# Patient Record
Sex: FEMALE | Race: WHITE
[De-identification: names, ages, dates, MRNs, and addresses within clinical notes are randomized per-mention and may not be internally consistent; named-entity substitution may affect disease eponyms.]

---

## 2022-07-13 ENCOUNTER — HOSPITAL ENCOUNTER (OUTPATIENT)
Dept: HOSPITAL 33 - ED | Age: 82
Setting detail: OBSERVATION
LOS: 1 days | Discharge: HOME | End: 2022-07-14
Attending: FAMILY MEDICINE | Admitting: FAMILY MEDICINE
Payer: MEDICARE

## 2022-07-13 DIAGNOSIS — Z20.828: ICD-10-CM

## 2022-07-13 DIAGNOSIS — R00.1: ICD-10-CM

## 2022-07-13 DIAGNOSIS — R42: ICD-10-CM

## 2022-07-13 DIAGNOSIS — R77.8: Primary | ICD-10-CM

## 2022-07-13 DIAGNOSIS — E78.00: ICD-10-CM

## 2022-07-13 DIAGNOSIS — Z79.899: ICD-10-CM

## 2022-07-13 DIAGNOSIS — I10: ICD-10-CM

## 2022-07-13 LAB
ALBUMIN SERPL-MCNC: 3.4 G/DL (ref 3.5–5)
ALP SERPL-CCNC: 92 U/L (ref 38–126)
ALT SERPL-CCNC: 9 U/L (ref 0–35)
ANION GAP SERPL CALC-SCNC: 13.6 MEQ/L (ref 5–15)
AST SERPL QL: 23 U/L (ref 14–36)
BASOPHILS # BLD AUTO: 0.06 X10^3/UL (ref 0–0.4)
BILIRUB BLD-MCNC: 0.3 MG/DL (ref 0.2–1.3)
BUN SERPL-MCNC: 19 MG/DL (ref 7–17)
CALCIUM SPEC-MCNC: 9.8 MG/DL (ref 8.4–10.2)
CHLORIDE SERPL-SCNC: 102 MMOL/L (ref 98–107)
CO2 SERPL-SCNC: 27 MMOL/L (ref 22–30)
CREAT SERPL-MCNC: 0.94 MG/DL (ref 0.52–1.04)
EOSINOPHIL # BLD AUTO: 0.09 X10^3/UL (ref 0–0.5)
FLUAV AG NPH QL IA: NEGATIVE
FLUBV AG NPH QL IA: NEGATIVE
GFR SERPLBLD BASED ON 1.73 SQ M-ARVRAT: > 60 ML/MIN
GLUCOSE SERPL-MCNC: 110 MG/DL (ref 74–106)
GLUCOSE UR-MCNC: NEGATIVE MG/DL
HCT VFR BLD AUTO: 38.8 % (ref 35–47)
HGB BLD-MCNC: 12.2 G/DL (ref 12–16)
LYMPHOCYTES # SPEC AUTO: 1.54 X10^3/UL (ref 1–4.6)
MAGNESIUM SERPL-MCNC: 2 MG/DL (ref 1.6–2.3)
MCH RBC QN AUTO: 27.6 PG (ref 26–32)
MCHC RBC AUTO-ENTMCNC: 31.4 G/DL (ref 32–36)
MONOCYTES # BLD AUTO: 0.73 X10^3/UL (ref 0–1.3)
PLATELET # BLD AUTO: 299 X10^3/UL (ref 150–450)
POTASSIUM SERPLBLD-SCNC: 4 MMOL/L (ref 3.5–5.1)
PROT SERPL-MCNC: 6.5 G/DL (ref 6.3–8.2)
PROT UR STRIP-MCNC: NEGATIVE MG/DL
RBC # BLD AUTO: 4.42 X10^6/UL (ref 4.1–5.4)
RBC # UR AUTO: NEGATIVE ERY/UL (ref 0–5)
RBC #/AREA URNS HPF: (no result) /HPF (ref 0–2)
RSV AG SPEC QL IA: NEGATIVE
SARS-COV-2 AG RESP QL IA.RAPID: NEGATIVE
SODIUM SERPL-SCNC: 139 MMOL/L (ref 137–145)
UA DIPSTICK PNL UR: (no result)
URINE CULTURED INDICATED?: NO
WBC # BLD AUTO: 7.2 X10^3/UL (ref 4–10.5)
WBC #/AREA URNS HPF: (no result) /HPF (ref 0–5)

## 2022-07-13 PROCEDURE — 0241U: CPT

## 2022-07-13 PROCEDURE — 36415 COLL VENOUS BLD VENIPUNCTURE: CPT

## 2022-07-13 PROCEDURE — 93005 ELECTROCARDIOGRAM TRACING: CPT

## 2022-07-13 PROCEDURE — 83735 ASSAY OF MAGNESIUM: CPT

## 2022-07-13 PROCEDURE — 93041 RHYTHM ECG TRACING: CPT

## 2022-07-13 PROCEDURE — 84484 ASSAY OF TROPONIN QUANT: CPT

## 2022-07-13 PROCEDURE — 36000 PLACE NEEDLE IN VEIN: CPT

## 2022-07-13 PROCEDURE — 80053 COMPREHEN METABOLIC PANEL: CPT

## 2022-07-13 PROCEDURE — 70450 CT HEAD/BRAIN W/O DYE: CPT

## 2022-07-13 PROCEDURE — 85025 COMPLETE CBC W/AUTO DIFF WBC: CPT

## 2022-07-13 PROCEDURE — 93268 ECG RECORD/REVIEW: CPT

## 2022-07-13 PROCEDURE — G0378 HOSPITAL OBSERVATION PER HR: HCPCS

## 2022-07-13 PROCEDURE — 81015 MICROSCOPIC EXAM OF URINE: CPT

## 2022-07-13 PROCEDURE — 99285 EMERGENCY DEPT VISIT HI MDM: CPT

## 2022-07-13 NOTE — ERPHSYRPT
- History of Present Illness


Time Seen by Provider: 07/13/22 09:58


Source: patient


Exam Limitations: no limitations


Patient Subjective Stated Complaint: Dizziness


Triage Nursing Assessment: Patient brought down to ED from cardiac rehab. 

Patient A+O X 3. Patient's skin pink ,warm and dry. Patient states she was doing

cardiac rehab when she started feeling dizzy. Patient states she feels better 

now and denies dizziness. Cardiac Rehab RN states patient's blood pressure 

usually runs high but today it was been running low. Patient denies pain or d

iscomfort.


Physician History: 





This is an 82-year-old white female who has a history of hypertension and was in

cardiac rehab when she was experiencing dizziness.  Patient was found to have 

low blood pressure and a low heart rate.  Patient denies chest pain at this time

and also denies dizziness upon entrance into the emergency department.  Her 

dizziness has resolved.  Patient does have a cardiologist, Dr. Bolanos.  Patient

did take a pain medicine this morning per her report.  She wonders if this might

have contributed to her symptoms.  Her heart rate upon arrival to the emergency 

department is 47 bpm.  Her blood pressure upon arrival to the emergency 

department was 125/62.  There are no new medications.  Patient denies shortness 

of breath.  She denies abdominal pain.  She has no nausea vomiting or diarrhea.


Timing/Duration: today


Severity: mild (And now resolved)


Character of Deficits: none


Deficits: no difficulties


Baseline/Normal Cognition: alert oriented x 3


Current Cognition: alert oriented x 3


Baseline Gait: walks w/o assistance


Associated Symptoms: denies symptoms


Allergies/Adverse Reactions: 








No Known Drug Allergies Allergy (Verified 07/13/22 09:48)


   





Home Medications: 








Amlodipine Besylate 10 mg [Norvasc 10 MG] 10 mg PO QAM 01/21/14 [History]


Lisinopril/Hydrochlorothiazide [Lisinopril-Hctz 20-25 mg Tab] 1 each PO QAM 

01/21/14 [History]





Hx Tetanus, Diphtheria Vaccination/Date Given: No


Hx Influenza Vaccination/Date Given: No


Hx Pneumococcal Vaccination/Date Given: No


Immunizations Up to Date: Yes





Travel Risk





- International Travel


Have you traveled outside of the country in past 3 weeks: No





- Coronavirus Screening


Are you exhibiting any of the following symptoms?: No


Close contact with a COVID-19 positive Pt in past 14-21 Days: No





- Vaccine Status


Have you recieved a Covid-19 vaccination: No





- Review of Systems


Constitutional: No Symptoms


Eyes: No Symptoms


Ears, Nose, & Throat: No Symptoms


Respiratory: No Symptoms


Cardiac: No Symptoms


Abdominal/Gastrointestinal: No Symptoms


Genitourinary Symptoms: No Symptoms


Musculoskeletal: No Symptoms


Skin: No Symptoms


Neurological: Dizziness


Psychological: No Symptoms


Endocrine: No Symptoms


Hematologic/Lymphatic: No Symptoms


Immunological/Allergic: No Symptoms


All Other Systems: Reviewed and Negative





- Past Medical History


Pertinent Past Medical History: Yes


Neurological History: No Pertinent History


ENT History: No Pertinent History


Cardiac History: Hypertension


Respiratory History: No Pertinent History


Endocrine Medical History: No Pertinent History


Musculoskeletal History: No Pertinent History


GI Medical History: No Pertinent History


 History: No Pertinent History


Psycho-Social History: No Pertinent History


Female Reproductive Disorders: No Pertinent History





- Past Surgical History


Past Surgical History: Yes


Neuro Surgical History: No Pertinent History


Cardiac: No Pertinent History


Respiratory: No Pertinent History


Gastrointestinal: No Pertinent History


Genitourinary: No Pertinent History


Musculoskeletal: Joint Replacement


Female Surgical History: No Pertinent History


Other Surgical History: RIGHT HIP REPLACEMENT,.  tonsillectomy





- Social History


Smoking Status: Never smoker


Exposure to second hand smoke: Yes (occasional)


Drug Use: none


Patient Lives Alone: Yes





- Nursing Vital Signs


Nursing Vital Signs: 


                               Initial Vital Signs











Temperature  96.0 F   07/13/22 09:49


 


Pulse Rate  47 L  07/13/22 09:49


 


Respiratory Rate  19   07/13/22 09:49


 


Blood Pressure  125/62   07/13/22 09:49


 


O2 Sat by Pulse Oximetry  97   07/13/22 09:49








                                   Pain Scale











Pain Intensity                 0

















- [place] Coma Scale


Best Eye Response (Holly): (4) open spontaneously


Best Verbal Response (Holly): (5) oriented


Best Motor Response ([place]): (6) obeys commands


Holly Total: 15





- Physical Exam


General Appearance: no apparent distress, alert, anxiety


Eye Exam: bilateral eye: normal inspection, PERRL, EOMI


Ears, Nose, Throat Exam: normal ENT inspection, moist mucous membranes


Neck Exam: normal inspection, non-tender, supple, full range of motion


Respiratory: normal breath sounds, lungs clear, airway intact, No chest 

tenderness, No respiratory distress


Cardiovascular: normal heart sounds, normal peripheral pulses, bradycardia


Gastrointestinal: soft, normal bowel sounds, No tenderness


Pelvic Exam: not done


Rectal Exam: not done


Back Exam: normal inspection, normal range of motion, No CVA tenderness, No 

vertebral tenderness


Extremity Exam: normal inspection, normal range of motion, pelvis stable


Mental Status: alert, oriented x 3, cooperative


CNs Exam: normal hearing, normal speech, PERRL, tongue midline


Motor/Sensory: no motor deficit, no sensory deficit, no pronator drift


Skin Exam: normal color, warm, dry


**SpO2 Interpretation**: normal


SpO2: 97


O2 Delivery: Room Air





- Course


Nursing assessment & vital signs reviewed: Yes


EKG Interpreted by Me: RATE ([id number]), Sinus Dante, Non-specific ST Changes, Other 

(No obvious acute ischemic changes on today's EKG.  There is not a comparison 

EKG available.)


Ordered Tests: 


                               Active Orders 24 hr











 Category Date Time Status


 


 Cardiac Monitor STAT Care  07/13/22 10:04 Active


 


 EKG-ER Only STAT Care  07/13/22 10:04 Active


 


 EKG-ER Only STAT Care  07/13/22 15:02 Active


 


 IV Insertion STAT Care  07/13/22 10:04 Active


 


 HEAD WITHOUT CONTRAST [CT] Stat Exams  07/13/22 10:04 Completed


 


 CBC W DIFF Stat Lab  07/13/22 10:15 Completed


 


 CMP Stat Lab  07/13/22 10:15 Completed


 


 MAGNESIUM Stat Lab  07/13/22 10:15 Completed


 


 TROPONIN Q3H Lab  07/13/22 10:15 Completed


 


 TROPONIN Q3H Lab  07/13/22 13:28 Completed


 


 TROPONIN Q3H Lab  07/13/22 15:55 Received


 


 TROPONIN Q3H Lab  07/13/22 19:15 Ordered


 


 TROPONIN Q3H Lab  07/13/22 22:15 Ordered


 


 UA W/RFX CULTURE Stat Lab  07/13/22 11:33 Completed


 


 Transfer Order Routine Transfer  07/13/22 Ordered








Medication Summary














Discontinued Medications














Generic Name Dose Route Start Last Admin





  Trade Name Freq  PRN Reason Stop Dose Admin


 


Sodium Chloride  500 mls @ 500 mls/hr  07/13/22 10:05  07/13/22 11:58





  Sodium Chloride 0.9% 500 Ml  IV  07/13/22 11:04  Infused





  .Q1H ONE   Infusion


 


Sodium Chloride  Confirm  07/13/22 10:43 





  Sodium Chloride 0.9% 500 Ml  Administered  07/13/22 10:44 





  Dose  





  500 mls @ ud  





  IV  





  .STK-MED ONE  











Lab/Rad Data: 


                           Laboratory Result Diagrams





                                 07/13/22 10:15 





                                 07/13/22 10:15 





                               Laboratory Results











  07/13/22 07/13/22 07/13/22 Range/Units





  13:28 11:33 10:15 


 


WBC     (4.0-10.5)  x10^3/uL


 


RBC     (4.1-5.4)  x10^6/uL


 


Hgb     (12.0-16.0)  g/dL


 


Hct     (35-47)  %


 


MCV     ()  fL


 


MCH     (26-32)  pg


 


MCHC     (32-36)  g/dL


 


RDW     (11.5-14.0)  %


 


Plt Count     (150-450)  x10^3/uL


 


MPV     (7.5-11.0)  fL


 


Gran %     (36.0-66.0)  %


 


Immature Gran % (Auto)     (0.00-0.4)  %


 


Nucleat RBC Rel Count     (0.00-0.1)  %


 


Eos # (Auto)     (0-0.5)  x10^3/uL


 


Immature Gran # (Auto)     (0.00-0.03)  x10^3u/L


 


Absolute Lymphs (auto)     (1.0-4.6)  x10^3/uL


 


Absolute Monos (auto)     (0.0-1.3)  x10^3/uL


 


Absolute Nucleated RBC     (0.00-0.01)  x10^3u/L


 


Lymphocytes %     (24.0-44.0)  %


 


Monocytes %     (0.0-12.0)  %


 


Eosinophils %     (0.00-5.0)  %


 


Basophils %     (0.0-0.4)  %


 


Absolute Granulocytes     (1.4-6.9)  x10^3/uL


 


Basophils #     (0-0.4)  x10^3/uL


 


Sodium     (137-145)  mmol/L


 


Potassium     (3.5-5.1)  mmol/L


 


Chloride     ()  mmol/L


 


Carbon Dioxide     (22-30)  mmol/L


 


Anion Gap     (5-15)  MEQ/L


 


BUN     (7-17)  mg/dL


 


Creatinine     (0.52-1.04)  mg/dL


 


Estimated GFR     ML/MIN


 


Glucose     ()  mg/dL


 


Calcium     (8.4-10.2)  mg/dL


 


Magnesium     (1.6-2.3)  mg/dL


 


Total Bilirubin     (0.2-1.3)  mg/dL


 


AST     (14-36)  U/L


 


ALT     (0-35)  U/L


 


Alkaline Phosphatase     ()  U/L


 


Troponin I  0.127 H*   0.055 H*  (0.000-0.034)  ng/mL


 


Serum Total Protein     (6.3-8.2)  g/dL


 


Albumin     (3.5-5.0)  g/dL


 


Urinalys Dipstick Clnc   MAIN LAB   


 


Urine Color   YELLOW   (YELLOW)  


 


Urine Appearance   CLEAR   (CLEAR)  


 


Urine pH   7.0   (5-6)  


 


Ur Specific Gravity   1.020   (1.005-1.025)  


 


POC Urine Protein Conf   NEGATIVE   (Negative)  


 


Urine Ketones   NEGATIVE   (NEGATIVE)  


 


Urine Nitrite   NEGATIVE   (NEGATIVE)  


 


Urine Bilirubin   NEGATIVE   (NEGATIVE)  


 


Urine Urobilinogen   0.2   (0-1)  mg/dL


 


Urine Leukocytes   NEGATIVE   (NEGATIVE)  


 


Urine WBC (Auto)   NONE   (0-5)  /HPF


 


Urine RBC (Auto)   0-2   (0-2)  /HPF


 


U Epithel Cells (Auto)   NONE   (FEW)  /HPF


 


Urine RBC   NEGATIVE   (0-5)  Maulik/ul


 


Urine Mucus (Auto)   SLIGHT   (NEGATIVE)  /HPF


 


Ur Culture Indicated?   NO   


 


Urine Glucose   NEGATIVE   (NEGATIVE)  mg/dL














  07/13/22 07/13/22 Range/Units





  10:15 10:15 


 


WBC   7.2  (4.0-10.5)  x10^3/uL


 


RBC   4.42  (4.1-5.4)  x10^6/uL


 


Hgb   12.2  (12.0-16.0)  g/dL


 


Hct   38.8  (35-47)  %


 


MCV   87.8  ()  fL


 


MCH   27.6  (26-32)  pg


 


MCHC   31.4 L  (32-36)  g/dL


 


RDW   14.1 H  (11.5-14.0)  %


 


Plt Count   299  (150-450)  x10^3/uL


 


MPV   9.9  (7.5-11.0)  fL


 


Gran %   66.2 H  (36.0-66.0)  %


 


Immature Gran % (Auto)   0.3  (0.00-0.4)  %


 


Nucleat RBC Rel Count   0.0  (0.00-0.1)  %


 


Eos # (Auto)   0.09  (0-0.5)  x10^3/uL


 


Immature Gran # (Auto)   0.02  (0.00-0.03)  x10^3u/L


 


Absolute Lymphs (auto)   1.54  (1.0-4.6)  x10^3/uL


 


Absolute Monos (auto)   0.73  (0.0-1.3)  x10^3/uL


 


Absolute Nucleated RBC   0.00  (0.00-0.01)  x10^3u/L


 


Lymphocytes %   21.4 L  (24.0-44.0)  %


 


Monocytes %   10.1  (0.0-12.0)  %


 


Eosinophils %   1.2  (0.00-5.0)  %


 


Basophils %   0.8  (0.0-0.4)  %


 


Absolute Granulocytes   4.77  (1.4-6.9)  x10^3/uL


 


Basophils #   0.06  (0-0.4)  x10^3/uL


 


Sodium  139   (137-145)  mmol/L


 


Potassium  4.0   (3.5-5.1)  mmol/L


 


Chloride  102   ()  mmol/L


 


Carbon Dioxide  27   (22-30)  mmol/L


 


Anion Gap  13.6   (5-15)  MEQ/L


 


BUN  19 H   (7-17)  mg/dL


 


Creatinine  0.94   (0.52-1.04)  mg/dL


 


Estimated GFR  > 60.0   ML/MIN


 


Glucose  110 H   ()  mg/dL


 


Calcium  9.8   (8.4-10.2)  mg/dL


 


Magnesium  2.0   (1.6-2.3)  mg/dL


 


Total Bilirubin  0.30   (0.2-1.3)  mg/dL


 


AST  23   (14-36)  U/L


 


ALT  9   (0-35)  U/L


 


Alkaline Phosphatase  92   ()  U/L


 


Troponin I    (0.000-0.034)  ng/mL


 


Serum Total Protein  6.5   (6.3-8.2)  g/dL


 


Albumin  3.4 L   (3.5-5.0)  g/dL


 


Urinalys Dipstick Clnc    


 


Urine Color    (YELLOW)  


 


Urine Appearance    (CLEAR)  


 


Urine pH    (5-6)  


 


Ur Specific Gravity    (1.005-1.025)  


 


POC Urine Protein Conf    (Negative)  


 


Urine Ketones    (NEGATIVE)  


 


Urine Nitrite    (NEGATIVE)  


 


Urine Bilirubin    (NEGATIVE)  


 


Urine Urobilinogen    (0-1)  mg/dL


 


Urine Leukocytes    (NEGATIVE)  


 


Urine WBC (Auto)    (0-5)  /HPF


 


Urine RBC (Auto)    (0-2)  /HPF


 


U Epithel Cells (Auto)    (FEW)  /HPF


 


Urine RBC    (0-5)  Maulik/ul


 


Urine Mucus (Auto)    (NEGATIVE)  /HPF


 


Ur Culture Indicated?    


 


Urine Glucose    (NEGATIVE)  mg/dL














- Progress


Progress: improved, re-examined


Progress Note: 





07/13/22 11:26


CT scan of the head without contrast shows no acute intracranial abnormality.


07/13/22 15:05


Repeat twelve-lead EKG was performed on 7/13/2022 at 1503 p.m. the rate is 49 

bpm it shows borderline prolonged UT interval with a sinus bradycardia.  There 

is incomplete right bundle alida block.  There is no evidence of acute ischemic

 changes on this EKG.


07/13/22 15:07


Patient currently denies chest pain.


07/13/22 16:05


Medical decision making: I first contacted the patient's primary cardiologist, 

Dr. Bolanos.  I reviewed the patient history, EKG findings as well as the report

 on rising troponin levels.  Dr. Bolanos stated that the patient is not having 

an acute myocardial infarction at this time.  The patient does have rising 

troponin levels.  However, the patient continues to deny chest pain and there 

are no significant findings on the EKGs to suggest an acute myocardial infar

ction.  Therefore, he feels that the patient can be observed here at [facility].  I did contact Dr. Rivera and he agrees to place the patient in 

observation and to keep the patient monitored overnight and will make the final 

disposition for this patient tomorrow.  Dr. Rivera does not feel that the patient

 needs to be anticoagulated at this time.  We will provide the patient with 

aspirin tonight.


Counseled pt/family regarding: lab results, diagnosis, need for follow-up, rad 

results





- Departure


Departure Disposition: Home


Clinical Impression: 


 Sinus bradycardia, Elevated troponin





Condition: Fair


Critical Care Time: No


Referrals: 


BEATRIZ KNOX NP [Primary Care Provider] - Follow up/PCP as directed

## 2022-07-13 NOTE — XRAY
Indication: Dizziness.



Multiple contiguous axial images obtained through the head without contrast.



Comparison: None



Normal appearing brain parenchyma, ventricles, and bony calvarium for

patient's age.  Visualized paranasal sinuses and mastoid air cells are clear.



Impression: Normal CT head without contrast exam.

## 2022-07-14 VITALS — OXYGEN SATURATION: 95 % | SYSTOLIC BLOOD PRESSURE: 132 MMHG | HEART RATE: 50 BPM | DIASTOLIC BLOOD PRESSURE: 61 MMHG

## 2022-07-14 LAB
ALBUMIN SERPL-MCNC: 3 G/DL (ref 3.5–5)
ALP SERPL-CCNC: 83 U/L (ref 38–126)
ALT SERPL-CCNC: 8 U/L (ref 0–35)
ANION GAP SERPL CALC-SCNC: 7.1 MEQ/L (ref 5–15)
AST SERPL QL: 23 U/L (ref 14–36)
BASOPHILS # BLD AUTO: 0.05 X10^3/UL (ref 0–0.4)
BILIRUB BLD-MCNC: 0.4 MG/DL (ref 0.2–1.3)
BUN SERPL-MCNC: 17 MG/DL (ref 7–17)
CALCIUM SPEC-MCNC: 9.2 MG/DL (ref 8.4–10.2)
CHLORIDE SERPL-SCNC: 105 MMOL/L (ref 98–107)
CO2 SERPL-SCNC: 29 MMOL/L (ref 22–30)
CREAT SERPL-MCNC: 0.75 MG/DL (ref 0.52–1.04)
EOSINOPHIL # BLD AUTO: 0.14 X10^3/UL (ref 0–0.5)
GFR SERPLBLD BASED ON 1.73 SQ M-ARVRAT: > 60 ML/MIN
GLUCOSE SERPL-MCNC: 100 MG/DL (ref 74–106)
HCT VFR BLD AUTO: 36.4 % (ref 35–47)
HGB BLD-MCNC: 11.6 G/DL (ref 12–16)
LYMPHOCYTES # SPEC AUTO: 1.5 X10^3/UL (ref 1–4.6)
MCH RBC QN AUTO: 27.3 PG (ref 26–32)
MCHC RBC AUTO-ENTMCNC: 31.9 G/DL (ref 32–36)
MONOCYTES # BLD AUTO: 0.63 X10^3/UL (ref 0–1.3)
PLATELET # BLD AUTO: 269 X10^3/UL (ref 150–450)
POTASSIUM SERPLBLD-SCNC: 3.6 MMOL/L (ref 3.5–5.1)
PROT SERPL-MCNC: 6.2 G/DL (ref 6.3–8.2)
RBC # BLD AUTO: 4.25 X10^6/UL (ref 4.1–5.4)
SODIUM SERPL-SCNC: 137 MMOL/L (ref 137–145)
WBC # BLD AUTO: 5.5 X10^3/UL (ref 4–10.5)

## 2022-07-14 NOTE — PCM.SSS
History of Present Illness





- Chief Complaint


Chief Complaint: ELEVATED TROPONINS


History of Present Illness: 


 is a 82 year old female with no local physician, she was in cardiac 

rehab and felt dizzy so was taken to the ER, she had no chest pain, no shortness

of breath, has been totally asymptomatic since admission, had mild elevation of 

troponin so ER Dr Menard discussed with Dr Nicholson her cardiologist and 

recommended observation here, again no chest pain, no shortness of breath or 

exertional dyspnea. she has normal oxygenation and does not require supplemental

oxygen at baseline nor in the hospital. troponin has been steady, mild elevation

with no changes in her EKG or rhythm on telemetry overnight.








- Review of Systems


Constitutional: No Fever, No Chills


Respiratory: No Cough, No Short Of Breath


Cardiac: No Chest Pain, No Edema, No Syncope


Abdominal/Gastrointestinal: No Abdominal Pain, No Nausea, No Vomiting, No 

Diarrhea


Genitourinary Symptoms: No Dysuria


Skin: No Rash


Neurological: Dizziness (resolved prior to admission)


All Other Systems: Reviewed and Negative





Medications & Allergies


Home Medications: 


                              Home Medication List





Amlodipine Besylate 10 mg [Norvasc 10 MG] 10 mg PO QAM 01/21/14 [History 

Confirmed 07/13/22]


Lisinopril/Hydrochlorothiazide [Lisinopril-Hctz 20-25 mg Tab] 1 each PO QAM 

01/21/14 [History Confirmed 07/13/22]


Aspirin EC 81 mg*** [Ecotrin 81 mg***] 81 mg PO DAILY 07/13/22 [History Con

firmed 07/13/22]


Atorvastatin Calcium 40 mg PO HS 07/13/22 [History Confirmed 07/13/22]


Clopidogrel Bisulfate [Clopidogrel] 75 mg PO DAILY 07/13/22 [History Confirmed 

07/13/22]


Methimazole 5 mg PO QAM 07/13/22 [History Confirmed 07/13/22]


Metoprolol Succinate 25 mg Xl* [Toprol-Xl 25MG Tablets***] 25 mg PO DAILY 

07/13/22 [History Confirmed 07/13/22]








Allergies/Adverse Reactions: 


                                    Allergies











Allergy/AdvReac Type Severity Reaction Status Date / Time


 


No Known Drug Allergies Allergy   Verified 07/13/22 09:48














- Past Medical History


Past Medical History: Yes


Neurological History: No Pertinent History


ENT History: No Pertinent History


Cardiac History: High Cholesterol, Hypertension


Respiratory History: No Pertinent History


Endocrine Medical History: No Pertinent History, Hyperthyroidism


Musculoskelatal History: No Pertinent History


GI Medical History: No Pertinent History


 History: No Pertinent History


Pyscho-Social History: No Pertinent History


Reproductive Disorders: No Pertinent History





- Female History


Are you pregnant now?: No





- Past Surgical History


Past Surgical History: Yes


Neuro Surgical History: No Pertinent History


Cardiac History: No Pertinent History


Respiratory Surgery: No Pertinent History


GI Surgical History: No Pertinent History


Genitourinary Surgical Hx: No Pertinent History


Musculskeletal Surgical Hx: Joint Replacement


Female Surgical History: No Pertinent History


Other Surgical History: BILATERAL HIP REPLACEMENTS AND OPEN HEART SURGERY IN 

2017





- Social History


Smoking Status: Never smoker


Exposure to second hand smoke: No


Alcohol: None


Drug Use: none





- Physical Exam


Vital Signs: 


                               Vital Signs - 24 hr











  Temp Pulse Resp BP Pulse Ox


 


 07/14/22 07:14  98.7 F  50 L  16  132/61  95


 


 07/14/22 04:00  97.7 F  55 L  16  163/76  97


 


 07/14/22 00:00  97.5 F  49 L  17  124/60  96


 


 07/13/22 20:00  97.5 F  50 L  16  165/72  94 L


 


 07/13/22 17:10      97


 


 07/13/22 16:11      97


 


 07/13/22 16:00   60  18   97


 


 07/13/22 12:00   50 L  17  160/68  95


 


 07/13/22 11:00  96.0 F  53 L  17  120/50  97


 


 07/13/22 10:41   47 L  19  123/56  93 L


 


 07/13/22 09:49  96.0 F  47 L  19  125/62  97











General Appearance: no apparent distress, alert


Neurologic Exam: alert, oriented x 3


Respiratory Exam: normal breath sounds, lungs clear, No respiratory distress


Cardiovascular Exam: regular rate/rhythm, normal heart sounds, normal peripheral

pulses


Gastrointestinal/Abdomen Exam: soft, normal bowel sounds, No tenderness, No mass


Extremity Exam: normal inspection, normal range of motion, pelvis stable


Skin Exam: normal color, warm, dry, No rash





Results





- Labs


Lab/Micro Results: 


                            Lab Results-Last 24 Hours











  07/13/22 07/13/22 07/13/22 Range/Units





  10:15 10:15 10:15 


 


WBC  7.2    (4.0-10.5)  x10^3/uL


 


RBC  4.42    (4.1-5.4)  x10^6/uL


 


Hgb  12.2    (12.0-16.0)  g/dL


 


Hct  38.8    (35-47)  %


 


MCV  87.8    ()  fL


 


MCH  27.6    (26-32)  pg


 


MCHC  31.4 L    (32-36)  g/dL


 


RDW  14.1 H    (11.5-14.0)  %


 


Plt Count  299    (150-450)  x10^3/uL


 


MPV  9.9    (7.5-11.0)  fL


 


Gran %  66.2 H    (36.0-66.0)  %


 


Immature Gran % (Auto)  0.3    (0.00-0.4)  %


 


Nucleat RBC Rel Count  0.0    (0.00-0.1)  %


 


Eos # (Auto)  0.09    (0-0.5)  x10^3/uL


 


Immature Gran # (Auto)  0.02    (0.00-0.03)  x10^3u/L


 


Absolute Lymphs (auto)  1.54    (1.0-4.6)  x10^3/uL


 


Absolute Monos (auto)  0.73    (0.0-1.3)  x10^3/uL


 


Absolute Nucleated RBC  0.00    (0.00-0.01)  x10^3u/L


 


Lymphocytes %  21.4 L    (24.0-44.0)  %


 


Monocytes %  10.1    (0.0-12.0)  %


 


Eosinophils %  1.2    (0.00-5.0)  %


 


Basophils %  0.8    (0.0-0.4)  %


 


Absolute Granulocytes  4.77    (1.4-6.9)  x10^3/uL


 


Basophils #  0.06    (0-0.4)  x10^3/uL


 


Sodium   139   (137-145)  mmol/L


 


Potassium   4.0   (3.5-5.1)  mmol/L


 


Chloride   102   ()  mmol/L


 


Carbon Dioxide   27   (22-30)  mmol/L


 


Anion Gap   13.6   (5-15)  MEQ/L


 


BUN   19 H   (7-17)  mg/dL


 


Creatinine   0.94   (0.52-1.04)  mg/dL


 


Estimated GFR   > 60.0   ML/MIN


 


Glucose   110 H   ()  mg/dL


 


Calcium   9.8   (8.4-10.2)  mg/dL


 


Magnesium   2.0   (1.6-2.3)  mg/dL


 


Total Bilirubin   0.30   (0.2-1.3)  mg/dL


 


AST   23   (14-36)  U/L


 


ALT   9   (0-35)  U/L


 


Alkaline Phosphatase   92   ()  U/L


 


Troponin I    0.055 H*  (0.000-0.034)  ng/mL


 


Serum Total Protein   6.5   (6.3-8.2)  g/dL


 


Albumin   3.4 L   (3.5-5.0)  g/dL


 


Urinalys Dipstick Clnc     


 


Urine Color     (YELLOW)  


 


Urine Appearance     (CLEAR)  


 


Urine pH     (5-6)  


 


Ur Specific Gravity     (1.005-1.025)  


 


POC Urine Protein Conf     (Negative)  


 


Urine Ketones     (NEGATIVE)  


 


Urine Nitrite     (NEGATIVE)  


 


Urine Bilirubin     (NEGATIVE)  


 


Urine Urobilinogen     (0-1)  mg/dL


 


Urine Leukocytes     (NEGATIVE)  


 


Urine WBC (Auto)     (0-5)  /HPF


 


Urine RBC (Auto)     (0-2)  /HPF


 


U Epithel Cells (Auto)     (FEW)  /HPF


 


Urine RBC     (0-5)  Maulik/ul


 


Urine Mucus (Auto)     (NEGATIVE)  /HPF


 


Ur Culture Indicated?     


 


Urine Glucose     (NEGATIVE)  mg/dL


 


Influenza Type A Ag     (NEGATIVE)  


 


Influenza Type B Ag     (NEGATIVE)  


 


RSV (PCR)     (Negative)  


 


SARS-CoV-2 (PCR)     (NEGATIVE)  














  07/13/22 07/13/22 07/13/22 Range/Units





  11:33 13:28 15:53 


 


WBC     (4.0-10.5)  x10^3/uL


 


RBC     (4.1-5.4)  x10^6/uL


 


Hgb     (12.0-16.0)  g/dL


 


Hct     (35-47)  %


 


MCV     ()  fL


 


MCH     (26-32)  pg


 


MCHC     (32-36)  g/dL


 


RDW     (11.5-14.0)  %


 


Plt Count     (150-450)  x10^3/uL


 


MPV     (7.5-11.0)  fL


 


Gran %     (36.0-66.0)  %


 


Immature Gran % (Auto)     (0.00-0.4)  %


 


Nucleat RBC Rel Count     (0.00-0.1)  %


 


Eos # (Auto)     (0-0.5)  x10^3/uL


 


Immature Gran # (Auto)     (0.00-0.03)  x10^3u/L


 


Absolute Lymphs (auto)     (1.0-4.6)  x10^3/uL


 


Absolute Monos (auto)     (0.0-1.3)  x10^3/uL


 


Absolute Nucleated RBC     (0.00-0.01)  x10^3u/L


 


Lymphocytes %     (24.0-44.0)  %


 


Monocytes %     (0.0-12.0)  %


 


Eosinophils %     (0.00-5.0)  %


 


Basophils %     (0.0-0.4)  %


 


Absolute Granulocytes     (1.4-6.9)  x10^3/uL


 


Basophils #     (0-0.4)  x10^3/uL


 


Sodium     (137-145)  mmol/L


 


Potassium     (3.5-5.1)  mmol/L


 


Chloride     ()  mmol/L


 


Carbon Dioxide     (22-30)  mmol/L


 


Anion Gap     (5-15)  MEQ/L


 


BUN     (7-17)  mg/dL


 


Creatinine     (0.52-1.04)  mg/dL


 


Estimated GFR     ML/MIN


 


Glucose     ()  mg/dL


 


Calcium     (8.4-10.2)  mg/dL


 


Magnesium     (1.6-2.3)  mg/dL


 


Total Bilirubin     (0.2-1.3)  mg/dL


 


AST     (14-36)  U/L


 


ALT     (0-35)  U/L


 


Alkaline Phosphatase     ()  U/L


 


Troponin I   0.127 H*   (0.000-0.034)  ng/mL


 


Serum Total Protein     (6.3-8.2)  g/dL


 


Albumin     (3.5-5.0)  g/dL


 


Urinalys Dipstick Clnc  MAIN LAB    


 


Urine Color  YELLOW    (YELLOW)  


 


Urine Appearance  CLEAR    (CLEAR)  


 


Urine pH  7.0    (5-6)  


 


Ur Specific Gravity  1.020    (1.005-1.025)  


 


POC Urine Protein Conf  NEGATIVE    (Negative)  


 


Urine Ketones  NEGATIVE    (NEGATIVE)  


 


Urine Nitrite  NEGATIVE    (NEGATIVE)  


 


Urine Bilirubin  NEGATIVE    (NEGATIVE)  


 


Urine Urobilinogen  0.2    (0-1)  mg/dL


 


Urine Leukocytes  NEGATIVE    (NEGATIVE)  


 


Urine WBC (Auto)  NONE    (0-5)  /HPF


 


Urine RBC (Auto)  0-2    (0-2)  /HPF


 


U Epithel Cells (Auto)  NONE    (FEW)  /HPF


 


Urine RBC  NEGATIVE    (0-5)  Maulik/ul


 


Urine Mucus (Auto)  SLIGHT    (NEGATIVE)  /HPF


 


Ur Culture Indicated?  NO    


 


Urine Glucose  NEGATIVE    (NEGATIVE)  mg/dL


 


Influenza Type A Ag    NEGATIVE  (NEGATIVE)  


 


Influenza Type B Ag    NEGATIVE  (NEGATIVE)  


 


RSV (PCR)    NEGATIVE  (Negative)  


 


SARS-CoV-2 (PCR)    NEGATIVE  (NEGATIVE)  














  07/13/22 07/13/22 07/13/22 Range/Units





  15:55 19:35 22:38 


 


WBC     (4.0-10.5)  x10^3/uL


 


RBC     (4.1-5.4)  x10^6/uL


 


Hgb     (12.0-16.0)  g/dL


 


Hct     (35-47)  %


 


MCV     ()  fL


 


MCH     (26-32)  pg


 


MCHC     (32-36)  g/dL


 


RDW     (11.5-14.0)  %


 


Plt Count     (150-450)  x10^3/uL


 


MPV     (7.5-11.0)  fL


 


Gran %     (36.0-66.0)  %


 


Immature Gran % (Auto)     (0.00-0.4)  %


 


Nucleat RBC Rel Count     (0.00-0.1)  %


 


Eos # (Auto)     (0-0.5)  x10^3/uL


 


Immature Gran # (Auto)     (0.00-0.03)  x10^3u/L


 


Absolute Lymphs (auto)     (1.0-4.6)  x10^3/uL


 


Absolute Monos (auto)     (0.0-1.3)  x10^3/uL


 


Absolute Nucleated RBC     (0.00-0.01)  x10^3u/L


 


Lymphocytes %     (24.0-44.0)  %


 


Monocytes %     (0.0-12.0)  %


 


Eosinophils %     (0.00-5.0)  %


 


Basophils %     (0.0-0.4)  %


 


Absolute Granulocytes     (1.4-6.9)  x10^3/uL


 


Basophils #     (0-0.4)  x10^3/uL


 


Sodium     (137-145)  mmol/L


 


Potassium     (3.5-5.1)  mmol/L


 


Chloride     ()  mmol/L


 


Carbon Dioxide     (22-30)  mmol/L


 


Anion Gap     (5-15)  MEQ/L


 


BUN     (7-17)  mg/dL


 


Creatinine     (0.52-1.04)  mg/dL


 


Estimated GFR     ML/MIN


 


Glucose     ()  mg/dL


 


Calcium     (8.4-10.2)  mg/dL


 


Magnesium     (1.6-2.3)  mg/dL


 


Total Bilirubin     (0.2-1.3)  mg/dL


 


AST     (14-36)  U/L


 


ALT     (0-35)  U/L


 


Alkaline Phosphatase     ()  U/L


 


Troponin I  0.153 H*  0.148 H*  0.142 H*  (0.000-0.034)  ng/mL


 


Serum Total Protein     (6.3-8.2)  g/dL


 


Albumin     (3.5-5.0)  g/dL


 


Urinalys Dipstick Clnc     


 


Urine Color     (YELLOW)  


 


Urine Appearance     (CLEAR)  


 


Urine pH     (5-6)  


 


Ur Specific Gravity     (1.005-1.025)  


 


POC Urine Protein Conf     (Negative)  


 


Urine Ketones     (NEGATIVE)  


 


Urine Nitrite     (NEGATIVE)  


 


Urine Bilirubin     (NEGATIVE)  


 


Urine Urobilinogen     (0-1)  mg/dL


 


Urine Leukocytes     (NEGATIVE)  


 


Urine WBC (Auto)     (0-5)  /HPF


 


Urine RBC (Auto)     (0-2)  /HPF


 


U Epithel Cells (Auto)     (FEW)  /HPF


 


Urine RBC     (0-5)  Maulik/ul


 


Urine Mucus (Auto)     (NEGATIVE)  /HPF


 


Ur Culture Indicated?     


 


Urine Glucose     (NEGATIVE)  mg/dL


 


Influenza Type A Ag     (NEGATIVE)  


 


Influenza Type B Ag     (NEGATIVE)  


 


RSV (PCR)     (Negative)  


 


SARS-CoV-2 (PCR)     (NEGATIVE)  














  07/14/22 07/14/22 Range/Units





  04:35 04:35 


 


WBC  5.5   (4.0-10.5)  x10^3/uL


 


RBC  4.25   (4.1-5.4)  x10^6/uL


 


Hgb  11.6 L   (12.0-16.0)  g/dL


 


Hct  36.4   (35-47)  %


 


MCV  85.6   ()  fL


 


MCH  27.3   (26-32)  pg


 


MCHC  31.9 L   (32-36)  g/dL


 


RDW  14.4 H   (11.5-14.0)  %


 


Plt Count  269   (150-450)  x10^3/uL


 


MPV  10.0   (7.5-11.0)  fL


 


Gran %  57.2   (36.0-66.0)  %


 


Immature Gran % (Auto)  0.2   (0.00-0.4)  %


 


Nucleat RBC Rel Count  0.0   (0.00-0.1)  %


 


Eos # (Auto)  0.14   (0-0.5)  x10^3/uL


 


Immature Gran # (Auto)  0.01   (0.00-0.03)  x10^3u/L


 


Absolute Lymphs (auto)  1.50   (1.0-4.6)  x10^3/uL


 


Absolute Monos (auto)  0.63   (0.0-1.3)  x10^3/uL


 


Absolute Nucleated RBC  0.00   (0.00-0.01)  x10^3u/L


 


Lymphocytes %  27.5   (24.0-44.0)  %


 


Monocytes %  11.6   (0.0-12.0)  %


 


Eosinophils %  2.6   (0.00-5.0)  %


 


Basophils %  0.9   (0.0-0.4)  %


 


Absolute Granulocytes  3.12   (1.4-6.9)  x10^3/uL


 


Basophils #  0.05   (0-0.4)  x10^3/uL


 


Sodium   137  (137-145)  mmol/L


 


Potassium   3.6  (3.5-5.1)  mmol/L


 


Chloride   105  ()  mmol/L


 


Carbon Dioxide   29  (22-30)  mmol/L


 


Anion Gap   7.1  (5-15)  MEQ/L


 


BUN   17  (7-17)  mg/dL


 


Creatinine   0.75  (0.52-1.04)  mg/dL


 


Estimated GFR   > 60.0  ML/MIN


 


Glucose   100  ()  mg/dL


 


Calcium   9.2  (8.4-10.2)  mg/dL


 


Magnesium    (1.6-2.3)  mg/dL


 


Total Bilirubin   0.40  (0.2-1.3)  mg/dL


 


AST   23  (14-36)  U/L


 


ALT   8  (0-35)  U/L


 


Alkaline Phosphatase   83  ()  U/L


 


Troponin I    (0.000-0.034)  ng/mL


 


Serum Total Protein   6.2 L  (6.3-8.2)  g/dL


 


Albumin   3.0 L  (3.5-5.0)  g/dL


 


Urinalys Dipstick Clnc    


 


Urine Color    (YELLOW)  


 


Urine Appearance    (CLEAR)  


 


Urine pH    (5-6)  


 


Ur Specific Gravity    (1.005-1.025)  


 


POC Urine Protein Conf    (Negative)  


 


Urine Ketones    (NEGATIVE)  


 


Urine Nitrite    (NEGATIVE)  


 


Urine Bilirubin    (NEGATIVE)  


 


Urine Urobilinogen    (0-1)  mg/dL


 


Urine Leukocytes    (NEGATIVE)  


 


Urine WBC (Auto)    (0-5)  /HPF


 


Urine RBC (Auto)    (0-2)  /HPF


 


U Epithel Cells (Auto)    (FEW)  /HPF


 


Urine RBC    (0-5)  Maulik/ul


 


Urine Mucus (Auto)    (NEGATIVE)  /HPF


 


Ur Culture Indicated?    


 


Urine Glucose    (NEGATIVE)  mg/dL


 


Influenza Type A Ag    (NEGATIVE)  


 


Influenza Type B Ag    (NEGATIVE)  


 


RSV (PCR)    (Negative)  


 


SARS-CoV-2 (PCR)    (NEGATIVE)  














- Radiology Impressions


Radiology Exams & Impressions: 


                              Radiology Procedures











 Category Date Time Status


 


 HEAD WITHOUT CONTRAST [CT] Stat Exams  07/13/22 10:04 Completed














Assessment/Plan


(1) Elevated troponin


Current Visit: Yes   Status: Acute   


Assessment & Plan: 


mild and nonspecific with lack of any clinical symptoms, will f/u with Dr Nicholson. continue aspirin, plavix and statin therapy. no changes to home meds, 

is on ace and beta blocker both at baseline


Code(s): R77.8 - OTHER SPECIFIED ABNORMALITIES OF PLASMA PROTEINS   





(2) Dizziness


Current Visit: Yes   Status: Acute   


Assessment & Plan: 


resolved prior to admission, normal neuro exam


Code(s): R42 - DIZZINESS AND GIDDINESS   





(3) Sinus bradycardia


Current Visit: Yes   Status: Acute   


Assessment & Plan: 


mild asymptomatic, no intervention required


Code(s): R00.1 - BRADYCARDIA, UNSPECIFIED   





Hospital Summary





- Vitals & Intake/Output


Vital Signs: 


                                   Vital Signs











Temperature  98.7 F   07/14/22 07:14


 


Pulse Rate  50 L  07/14/22 07:14


 


Respiratory Rate  16   07/14/22 07:14


 


Blood Pressure  132/61   07/14/22 07:14


 


O2 Sat by Pulse Oximetry  95   07/14/22 07:14











Intake & Output: 


                                 Intake & Output











 07/11/22 07/12/22 07/13/22 07/14/22





 11:59 11:59 11:59 11:59


 


Intake Total    858


 


Balance    858


 


Weight   77.111 kg 78.4 kg














- Lab


Result Diagrams: 


                                 07/14/22 04:35





                                 07/14/22 04:35


Lab Results-Last 24 Hrs: 


                            Lab Results-Last 24 Hours











  07/13/22 07/13/22 07/13/22 Range/Units





  10:15 10:15 10:15 


 


WBC  7.2    (4.0-10.5)  x10^3/uL


 


RBC  4.42    (4.1-5.4)  x10^6/uL


 


Hgb  12.2    (12.0-16.0)  g/dL


 


Hct  38.8    (35-47)  %


 


MCV  87.8    ()  fL


 


MCH  27.6    (26-32)  pg


 


MCHC  31.4 L    (32-36)  g/dL


 


RDW  14.1 H    (11.5-14.0)  %


 


Plt Count  299    (150-450)  x10^3/uL


 


MPV  9.9    (7.5-11.0)  fL


 


Gran %  66.2 H    (36.0-66.0)  %


 


Immature Gran % (Auto)  0.3    (0.00-0.4)  %


 


Nucleat RBC Rel Count  0.0    (0.00-0.1)  %


 


Eos # (Auto)  0.09    (0-0.5)  x10^3/uL


 


Immature Gran # (Auto)  0.02    (0.00-0.03)  x10^3u/L


 


Absolute Lymphs (auto)  1.54    (1.0-4.6)  x10^3/uL


 


Absolute Monos (auto)  0.73    (0.0-1.3)  x10^3/uL


 


Absolute Nucleated RBC  0.00    (0.00-0.01)  x10^3u/L


 


Lymphocytes %  21.4 L    (24.0-44.0)  %


 


Monocytes %  10.1    (0.0-12.0)  %


 


Eosinophils %  1.2    (0.00-5.0)  %


 


Basophils %  0.8    (0.0-0.4)  %


 


Absolute Granulocytes  4.77    (1.4-6.9)  x10^3/uL


 


Basophils #  0.06    (0-0.4)  x10^3/uL


 


Sodium   139   (137-145)  mmol/L


 


Potassium   4.0   (3.5-5.1)  mmol/L


 


Chloride   102   ()  mmol/L


 


Carbon Dioxide   27   (22-30)  mmol/L


 


Anion Gap   13.6   (5-15)  MEQ/L


 


BUN   19 H   (7-17)  mg/dL


 


Creatinine   0.94   (0.52-1.04)  mg/dL


 


Estimated GFR   > 60.0   ML/MIN


 


Glucose   110 H   ()  mg/dL


 


Calcium   9.8   (8.4-10.2)  mg/dL


 


Magnesium   2.0   (1.6-2.3)  mg/dL


 


Total Bilirubin   0.30   (0.2-1.3)  mg/dL


 


AST   23   (14-36)  U/L


 


ALT   9   (0-35)  U/L


 


Alkaline Phosphatase   92   ()  U/L


 


Troponin I    0.055 H*  (0.000-0.034)  ng/mL


 


Serum Total Protein   6.5   (6.3-8.2)  g/dL


 


Albumin   3.4 L   (3.5-5.0)  g/dL


 


Urinalys Dipstick Clnc     


 


Urine Color     (YELLOW)  


 


Urine Appearance     (CLEAR)  


 


Urine pH     (5-6)  


 


Ur Specific Gravity     (1.005-1.025)  


 


POC Urine Protein Conf     (Negative)  


 


Urine Ketones     (NEGATIVE)  


 


Urine Nitrite     (NEGATIVE)  


 


Urine Bilirubin     (NEGATIVE)  


 


Urine Urobilinogen     (0-1)  mg/dL


 


Urine Leukocytes     (NEGATIVE)  


 


Urine WBC (Auto)     (0-5)  /HPF


 


Urine RBC (Auto)     (0-2)  /HPF


 


U Epithel Cells (Auto)     (FEW)  /HPF


 


Urine RBC     (0-5)  Maulik/ul


 


Urine Mucus (Auto)     (NEGATIVE)  /HPF


 


Ur Culture Indicated?     


 


Urine Glucose     (NEGATIVE)  mg/dL


 


Influenza Type A Ag     (NEGATIVE)  


 


Influenza Type B Ag     (NEGATIVE)  


 


RSV (PCR)     (Negative)  


 


SARS-CoV-2 (PCR)     (NEGATIVE)  














  07/13/22 07/13/22 07/13/22 Range/Units





  11:33 13:28 15:53 


 


WBC     (4.0-10.5)  x10^3/uL


 


RBC     (4.1-5.4)  x10^6/uL


 


Hgb     (12.0-16.0)  g/dL


 


Hct     (35-47)  %


 


MCV     ()  fL


 


MCH     (26-32)  pg


 


MCHC     (32-36)  g/dL


 


RDW     (11.5-14.0)  %


 


Plt Count     (150-450)  x10^3/uL


 


MPV     (7.5-11.0)  fL


 


Gran %     (36.0-66.0)  %


 


Immature Gran % (Auto)     (0.00-0.4)  %


 


Nucleat RBC Rel Count     (0.00-0.1)  %


 


Eos # (Auto)     (0-0.5)  x10^3/uL


 


Immature Gran # (Auto)     (0.00-0.03)  x10^3u/L


 


Absolute Lymphs (auto)     (1.0-4.6)  x10^3/uL


 


Absolute Monos (auto)     (0.0-1.3)  x10^3/uL


 


Absolute Nucleated RBC     (0.00-0.01)  x10^3u/L


 


Lymphocytes %     (24.0-44.0)  %


 


Monocytes %     (0.0-12.0)  %


 


Eosinophils %     (0.00-5.0)  %


 


Basophils %     (0.0-0.4)  %


 


Absolute Granulocytes     (1.4-6.9)  x10^3/uL


 


Basophils #     (0-0.4)  x10^3/uL


 


Sodium     (137-145)  mmol/L


 


Potassium     (3.5-5.1)  mmol/L


 


Chloride     ()  mmol/L


 


Carbon Dioxide     (22-30)  mmol/L


 


Anion Gap     (5-15)  MEQ/L


 


BUN     (7-17)  mg/dL


 


Creatinine     (0.52-1.04)  mg/dL


 


Estimated GFR     ML/MIN


 


Glucose     ()  mg/dL


 


Calcium     (8.4-10.2)  mg/dL


 


Magnesium     (1.6-2.3)  mg/dL


 


Total Bilirubin     (0.2-1.3)  mg/dL


 


AST     (14-36)  U/L


 


ALT     (0-35)  U/L


 


Alkaline Phosphatase     ()  U/L


 


Troponin I   0.127 H*   (0.000-0.034)  ng/mL


 


Serum Total Protein     (6.3-8.2)  g/dL


 


Albumin     (3.5-5.0)  g/dL


 


Urinalys Dipstick Clnc  MAIN LAB    


 


Urine Color  YELLOW    (YELLOW)  


 


Urine Appearance  CLEAR    (CLEAR)  


 


Urine pH  7.0    (5-6)  


 


Ur Specific Gravity  1.020    (1.005-1.025)  


 


POC Urine Protein Conf  NEGATIVE    (Negative)  


 


Urine Ketones  NEGATIVE    (NEGATIVE)  


 


Urine Nitrite  NEGATIVE    (NEGATIVE)  


 


Urine Bilirubin  NEGATIVE    (NEGATIVE)  


 


Urine Urobilinogen  0.2    (0-1)  mg/dL


 


Urine Leukocytes  NEGATIVE    (NEGATIVE)  


 


Urine WBC (Auto)  NONE    (0-5)  /HPF


 


Urine RBC (Auto)  0-2    (0-2)  /HPF


 


U Epithel Cells (Auto)  NONE    (FEW)  /HPF


 


Urine RBC  NEGATIVE    (0-5)  Maulik/ul


 


Urine Mucus (Auto)  SLIGHT    (NEGATIVE)  /HPF


 


Ur Culture Indicated?  NO    


 


Urine Glucose  NEGATIVE    (NEGATIVE)  mg/dL


 


Influenza Type A Ag    NEGATIVE  (NEGATIVE)  


 


Influenza Type B Ag    NEGATIVE  (NEGATIVE)  


 


RSV (PCR)    NEGATIVE  (Negative)  


 


SARS-CoV-2 (PCR)    NEGATIVE  (NEGATIVE)  














  07/13/22 07/13/22 07/13/22 Range/Units





  15:55 19:35 22:38 


 


WBC     (4.0-10.5)  x10^3/uL


 


RBC     (4.1-5.4)  x10^6/uL


 


Hgb     (12.0-16.0)  g/dL


 


Hct     (35-47)  %


 


MCV     ()  fL


 


MCH     (26-32)  pg


 


MCHC     (32-36)  g/dL


 


RDW     (11.5-14.0)  %


 


Plt Count     (150-450)  x10^3/uL


 


MPV     (7.5-11.0)  fL


 


Gran %     (36.0-66.0)  %


 


Immature Gran % (Auto)     (0.00-0.4)  %


 


Nucleat RBC Rel Count     (0.00-0.1)  %


 


Eos # (Auto)     (0-0.5)  x10^3/uL


 


Immature Gran # (Auto)     (0.00-0.03)  x10^3u/L


 


Absolute Lymphs (auto)     (1.0-4.6)  x10^3/uL


 


Absolute Monos (auto)     (0.0-1.3)  x10^3/uL


 


Absolute Nucleated RBC     (0.00-0.01)  x10^3u/L


 


Lymphocytes %     (24.0-44.0)  %


 


Monocytes %     (0.0-12.0)  %


 


Eosinophils %     (0.00-5.0)  %


 


Basophils %     (0.0-0.4)  %


 


Absolute Granulocytes     (1.4-6.9)  x10^3/uL


 


Basophils #     (0-0.4)  x10^3/uL


 


Sodium     (137-145)  mmol/L


 


Potassium     (3.5-5.1)  mmol/L


 


Chloride     ()  mmol/L


 


Carbon Dioxide     (22-30)  mmol/L


 


Anion Gap     (5-15)  MEQ/L


 


BUN     (7-17)  mg/dL


 


Creatinine     (0.52-1.04)  mg/dL


 


Estimated GFR     ML/MIN


 


Glucose     ()  mg/dL


 


Calcium     (8.4-10.2)  mg/dL


 


Magnesium     (1.6-2.3)  mg/dL


 


Total Bilirubin     (0.2-1.3)  mg/dL


 


AST     (14-36)  U/L


 


ALT     (0-35)  U/L


 


Alkaline Phosphatase     ()  U/L


 


Troponin I  0.153 H*  0.148 H*  0.142 H*  (0.000-0.034)  ng/mL


 


Serum Total Protein     (6.3-8.2)  g/dL


 


Albumin     (3.5-5.0)  g/dL


 


Urinalys Dipstick Clnc     


 


Urine Color     (YELLOW)  


 


Urine Appearance     (CLEAR)  


 


Urine pH     (5-6)  


 


Ur Specific Gravity     (1.005-1.025)  


 


POC Urine Protein Conf     (Negative)  


 


Urine Ketones     (NEGATIVE)  


 


Urine Nitrite     (NEGATIVE)  


 


Urine Bilirubin     (NEGATIVE)  


 


Urine Urobilinogen     (0-1)  mg/dL


 


Urine Leukocytes     (NEGATIVE)  


 


Urine WBC (Auto)     (0-5)  /HPF


 


Urine RBC (Auto)     (0-2)  /HPF


 


U Epithel Cells (Auto)     (FEW)  /HPF


 


Urine RBC     (0-5)  Maulik/ul


 


Urine Mucus (Auto)     (NEGATIVE)  /HPF


 


Ur Culture Indicated?     


 


Urine Glucose     (NEGATIVE)  mg/dL


 


Influenza Type A Ag     (NEGATIVE)  


 


Influenza Type B Ag     (NEGATIVE)  


 


RSV (PCR)     (Negative)  


 


SARS-CoV-2 (PCR)     (NEGATIVE)  














  07/14/22 07/14/22 Range/Units





  04:35 04:35 


 


WBC  5.5   (4.0-10.5)  x10^3/uL


 


RBC  4.25   (4.1-5.4)  x10^6/uL


 


Hgb  11.6 L   (12.0-16.0)  g/dL


 


Hct  36.4   (35-47)  %


 


MCV  85.6   ()  fL


 


MCH  27.3   (26-32)  pg


 


MCHC  31.9 L   (32-36)  g/dL


 


RDW  14.4 H   (11.5-14.0)  %


 


Plt Count  269   (150-450)  x10^3/uL


 


MPV  10.0   (7.5-11.0)  fL


 


Gran %  57.2   (36.0-66.0)  %


 


Immature Gran % (Auto)  0.2   (0.00-0.4)  %


 


Nucleat RBC Rel Count  0.0   (0.00-0.1)  %


 


Eos # (Auto)  0.14   (0-0.5)  x10^3/uL


 


Immature Gran # (Auto)  0.01   (0.00-0.03)  x10^3u/L


 


Absolute Lymphs (auto)  1.50   (1.0-4.6)  x10^3/uL


 


Absolute Monos (auto)  0.63   (0.0-1.3)  x10^3/uL


 


Absolute Nucleated RBC  0.00   (0.00-0.01)  x10^3u/L


 


Lymphocytes %  27.5   (24.0-44.0)  %


 


Monocytes %  11.6   (0.0-12.0)  %


 


Eosinophils %  2.6   (0.00-5.0)  %


 


Basophils %  0.9   (0.0-0.4)  %


 


Absolute Granulocytes  3.12   (1.4-6.9)  x10^3/uL


 


Basophils #  0.05   (0-0.4)  x10^3/uL


 


Sodium   137  (137-145)  mmol/L


 


Potassium   3.6  (3.5-5.1)  mmol/L


 


Chloride   105  ()  mmol/L


 


Carbon Dioxide   29  (22-30)  mmol/L


 


Anion Gap   7.1  (5-15)  MEQ/L


 


BUN   17  (7-17)  mg/dL


 


Creatinine   0.75  (0.52-1.04)  mg/dL


 


Estimated GFR   > 60.0  ML/MIN


 


Glucose   100  ()  mg/dL


 


Calcium   9.2  (8.4-10.2)  mg/dL


 


Magnesium    (1.6-2.3)  mg/dL


 


Total Bilirubin   0.40  (0.2-1.3)  mg/dL


 


AST   23  (14-36)  U/L


 


ALT   8  (0-35)  U/L


 


Alkaline Phosphatase   83  ()  U/L


 


Troponin I    (0.000-0.034)  ng/mL


 


Serum Total Protein   6.2 L  (6.3-8.2)  g/dL


 


Albumin   3.0 L  (3.5-5.0)  g/dL


 


Urinalys Dipstick Clnc    


 


Urine Color    (YELLOW)  


 


Urine Appearance    (CLEAR)  


 


Urine pH    (5-6)  


 


Ur Specific Gravity    (1.005-1.025)  


 


POC Urine Protein Conf    (Negative)  


 


Urine Ketones    (NEGATIVE)  


 


Urine Nitrite    (NEGATIVE)  


 


Urine Bilirubin    (NEGATIVE)  


 


Urine Urobilinogen    (0-1)  mg/dL


 


Urine Leukocytes    (NEGATIVE)  


 


Urine WBC (Auto)    (0-5)  /HPF


 


Urine RBC (Auto)    (0-2)  /HPF


 


U Epithel Cells (Auto)    (FEW)  /HPF


 


Urine RBC    (0-5)  Maulik/ul


 


Urine Mucus (Auto)    (NEGATIVE)  /HPF


 


Ur Culture Indicated?    


 


Urine Glucose    (NEGATIVE)  mg/dL


 


Influenza Type A Ag    (NEGATIVE)  


 


Influenza Type B Ag    (NEGATIVE)  


 


RSV (PCR)    (Negative)  


 


SARS-CoV-2 (PCR)    (NEGATIVE)  














- Radiology Exams


Ordered Rad Exams-Entire Visit: 


                              Radiology Procedures











 Category Date Time Status


 


 HEAD WITHOUT CONTRAST [CT] Stat Exams  07/13/22 10:04 Completed














- Procedures and Test


Procedures and Tests throughout Hospitalization: 


                            Therapy Orders & Screens





07/13/22 17:10


EKG REPEAT IN AM 


   Comment: 














- Discharge


Disposition: Home, Self-Care


Condition: Fair


Prescriptions: 


Continue


   Lisinopril/Hydrochlorothiazide [Lisinopril-Hctz 20-25 mg Tab] 1 each PO QAM


   Amlodipine Besylate 10 mg [Norvasc 10 MG] 10 mg PO QAM


   Clopidogrel Bisulfate [Clopidogrel] 75 mg PO DAILY


   Aspirin EC 81 mg*** [Ecotrin 81 mg***] 81 mg PO DAILY


   Metoprolol Succinate 25 mg Xl* [Toprol-Xl 25MG Tablets***] 25 mg PO DAILY


   Methimazole 5 mg PO QAM


   Atorvastatin Calcium 40 mg PO HS


Instructions:  Bradycardia (DC)


Follow up with: 


BEATRIZ KNOX NP [Primary Care Provider] - 


TIKA NICHOLSON [CONSULTING PHYSICIAN] - Call for Appointment

## 2025-02-28 ENCOUNTER — HOSPITAL ENCOUNTER (EMERGENCY)
Dept: HOSPITAL 33 - ED | Age: 85
Discharge: HOME | End: 2025-02-28
Payer: MEDICARE

## 2025-02-28 VITALS — HEART RATE: 80 BPM | RESPIRATION RATE: 22 BRPM

## 2025-02-28 VITALS — TEMPERATURE: 97 F

## 2025-02-28 VITALS — SYSTOLIC BLOOD PRESSURE: 124 MMHG | OXYGEN SATURATION: 98 % | DIASTOLIC BLOOD PRESSURE: 76 MMHG

## 2025-02-28 DIAGNOSIS — Z79.01: ICD-10-CM

## 2025-02-28 DIAGNOSIS — I12.9: ICD-10-CM

## 2025-02-28 DIAGNOSIS — R42: Primary | ICD-10-CM

## 2025-02-28 DIAGNOSIS — E78.5: ICD-10-CM

## 2025-02-28 DIAGNOSIS — D64.9: ICD-10-CM

## 2025-02-28 DIAGNOSIS — N18.9: ICD-10-CM

## 2025-02-28 DIAGNOSIS — Z79.899: ICD-10-CM

## 2025-02-28 DIAGNOSIS — N39.0: ICD-10-CM

## 2025-02-28 LAB
ALBUMIN SERPL-MCNC: 3.7 G/DL (ref 3.5–5)
ALP SERPL-CCNC: 62 U/L (ref 38–126)
ALT SERPL-CCNC: 15 U/L (ref 0–35)
ANION GAP SERPL CALC-SCNC: 12.8 MEQ/L (ref 5–15)
AST SERPL QL: 48 U/L (ref 14–36)
BASOPHILS # BLD AUTO: 0.04 X10^3/UL (ref 0.01–0.08)
BASOPHILS NFR BLD AUTO: 0.8 % (ref 0.1–1.2)
BILIRUB BLD-MCNC: 1 MG/DL (ref 0.2–1.3)
BUN BLD-MCNC: 40 MG/DL (ref 8–26)
BUN SERPL-MCNC: 36 MG/DL (ref 7–17)
CA-I BLDV-SCNC: 1.26 MMOL/L (ref 1.12–1.32)
CALCIUM SPEC-MCNC: 9.9 MG/DL (ref 8.4–10.2)
CHLORIDE BLD-SCNC: 98 MMOL/L (ref 98–109)
CHLORIDE SERPL-SCNC: 100 MMOL/L (ref 98–107)
CO2 BLDV-SCNC: 30 MMOL/L (ref 24–29)
CO2 SERPL-SCNC: 28 MMOL/L (ref 22–30)
CREAT BLD-MCNC: 1.7 MG/DL (ref 0.6–1.3)
CREAT SERPL-MCNC: 1.44 MG/DL (ref 0.52–1.04)
EOSINOPHIL # BLD AUTO: 0.07 X10^3/UL (ref 0.04–0.36)
GFR SERPLBLD BASED ON 1.73 SQ M-ARVRAT: 35.2 ML/MIN
GLUCOSE BLD-MCNC: 125 MG/DL (ref 70–105)
GLUCOSE SERPL-MCNC: 121 MG/DL (ref 74–106)
HCT VFR BLD AUTO: 32.5 % (ref 34.1–44.9)
HGB BLD-MCNC: 9.9 G/DL (ref 11.2–15.7)
IMM GRANULOCYTES # BLD: 0.01 X10^3U/L (ref 0–0.03)
IMM GRANULOCYTES NFR BLD: 0.2 % (ref 0–0.43)
LYMPHOCYTES # SPEC AUTO: 1.03 X10^3/UL (ref 1.18–3.74)
MAGNESIUM SERPL-MCNC: 2.2 MG/DL (ref 1.6–2.3)
MCH RBC QN AUTO: 25.9 PG (ref 25.6–32.2)
MCHC RBC AUTO-ENTMCNC: 30.5 G/DL (ref 32.2–35.5)
MONOCYTES # BLD AUTO: 0.69 X10^3/UL (ref 0.24–0.86)
NRBC # BLD AUTO: 0 X10^3U/L (ref 0–0.01)
NRBC BLD AUTO-RTO: 0 % (ref 0–0.2)
PLATELET # BLD AUTO: 235 X10^3/UL (ref 182–369)
POTASSIUM BLD-SCNC: 3.6 MMOL/L (ref 3.5–4.9)
POTASSIUM SERPLBLD-SCNC: 3.8 MMOL/L (ref 3.5–5.1)
PROT SERPL-MCNC: 7.1 G/DL (ref 6.3–8.2)
RBC # BLD AUTO: 3.82 X10^6/UL (ref 3.93–5.22)
RBC # URNS HPF: (no result) /HPF (ref 0–5)
SODIUM BLD-SCNC: 137 MMOL/L (ref 138–146)
SODIUM SERPL-SCNC: 137 MMOL/L (ref 135–145)
TROPONIN T SERPL HS-MCNC: < 0.012 NG/ML (ref 0–0.03)
WBC # BLD AUTO: 5 X10^3/UL (ref 3.98–10.04)
WBC URNS QL MICRO: (no result) /HPF (ref 0–5)

## 2025-02-28 PROCEDURE — 85025 COMPLETE CBC W/AUTO DIFF WBC: CPT

## 2025-02-28 PROCEDURE — 80053 COMPREHEN METABOLIC PANEL: CPT

## 2025-02-28 PROCEDURE — 84443 ASSAY THYROID STIM HORMONE: CPT

## 2025-02-28 PROCEDURE — 99285 EMERGENCY DEPT VISIT HI MDM: CPT

## 2025-02-28 PROCEDURE — 87086 URINE CULTURE/COLONY COUNT: CPT

## 2025-02-28 PROCEDURE — 93005 ELECTROCARDIOGRAM TRACING: CPT

## 2025-02-28 PROCEDURE — 99284 EMERGENCY DEPT VISIT MOD MDM: CPT

## 2025-02-28 PROCEDURE — 81001 URINALYSIS AUTO W/SCOPE: CPT

## 2025-02-28 PROCEDURE — 83735 ASSAY OF MAGNESIUM: CPT

## 2025-02-28 PROCEDURE — 84439 ASSAY OF FREE THYROXINE: CPT

## 2025-02-28 PROCEDURE — 36415 COLL VENOUS BLD VENIPUNCTURE: CPT

## 2025-02-28 PROCEDURE — 70450 CT HEAD/BRAIN W/O DYE: CPT

## 2025-02-28 PROCEDURE — 84484 ASSAY OF TROPONIN QUANT: CPT

## 2025-02-28 PROCEDURE — 80047 BASIC METABLC PNL IONIZED CA: CPT

## 2025-02-28 RX ADMIN — CEPHALEXIN ONE MG: 500 CAPSULE ORAL at 12:03

## 2025-02-28 NOTE — ERPHSYRPT
- History of Present Illness


Time Seen by Provider: 02/28/25 09:26


Source: patient, family


Exam Limitations: no limitations


Physician History: 





This is an 85-year-old white female patient who arrives from cardiac rehab where

she was experiencing mild dizziness without chest pain and without shortness of 

breath there.  She was sent to the emergency department where upon arrival to 

the emergency department, her symptoms completely resolved and she states she is

back to baseline.  She has no complaints of chest pain and no complaints of 

dizziness and no complaints of shortness of breath in the emergency department. 

Patient has a history of atrial fibrillation and is on Xarelto.  She has a 

history of hyperthyroidism, coronary artery disease (CABG) hyperlipidemia and 

hypertension.


Timing/Duration: today


Severity: mild


Character of Deficits: none


Deficits: no difficulties


Baseline/Normal Cognition: alert oriented x 3


Current Cognition: alert oriented x 3


Baseline Gait: walks w/o assistance


Associated Symptoms: denies symptoms


Allergies/Adverse Reactions: 








No Known Drug Allergies Allergy (Verified 02/28/25 09:33)


   





Home Medications: 








Atorvastatin Calcium 80 mg PO DAILY 07/13/22 [History]


Methimazole 5 mg PO DAILY 07/13/22 [History]


Calcium Carbonate [Calcium] 600 mg PO DAILY 02/28/25 [History]


Diltiazem HCl 30 mg*** [Cardizem 30 MG***] 30 mg PO DAILY 02/28/25 [History]


Lisinopril 20 mg*** [Zestril 20 MG***] 20 mg PO DAILY 02/28/25 [History]


Lutein 10 mg PO DAILY 02/28/25 [History]


Magnesium Oxide 400 mg*** [Mag-Ox 400***] 400 mg PO DAILY 02/28/25 [History]


Mv-Mn/Iron/Folic Acid/Herb 190 [Vitamin D3 Complete Caplet] 1 tab PO DAILY 

02/28/25 [History]


Nitroglycerin 0.4 mg Tablet*** [Nitrostat 0.4 MG Tablet***] 0.4 mg SL Q5MIN PRN 

MR X 3 PRN 02/28/25 [History]


Rivaroxaban [Xarelto] 20 mg PO DAILY 02/28/25 [History]


Selenium 200 mcg PO DAILY 02/28/25 [History]





Hx Tetanus, Diphtheria Vaccination/Date Given: No


Hx Influenza Vaccination/Date Given: No


Hx Pneumococcal Vaccination/Date Given: No





Travel Risk





- International Travel


Have you traveled outside of the country in past 3 weeks: No





- Emerging Infectious Disease


Are you exhibiting symptoms associated with any current EIDs: No





- Review of Systems


Constitutional: No Symptoms


Eyes: No Symptoms


Ears, Nose, & Throat: No Symptoms


Respiratory: No Symptoms


Cardiac: No Symptoms


Abdominal/Gastrointestinal: No Symptoms


Genitourinary Symptoms: No Symptoms


Musculoskeletal: No Symptoms


Skin: No Symptoms


Neurological: Dizziness (Now completely resolved)


Psychological: No Symptoms


Endocrine: No Symptoms


Hematologic/Lymphatic: No Symptoms


Immunological/Allergic: No Symptoms


All Other Systems: Reviewed and Negative





- Past Medical History


Pertinent Past Medical History: Yes


Cardiac History: High Cholesterol, Hypertension, Myocardial Infarction (MI), 

Other


Respiratory History: Other


Endocrine Medical History: Hyperthyroidism


Musculoskeletal History: Arthritis, Fractures


Other Medical History: COVID-19 x1, cardiac bypass, B ARABELLA (2003, 2017), 

hysterectomy, urinary incontinence





- Past Surgical History


Past Surgical History: Yes


Neuro Surgical History: No Pertinent History


Cardiac: No Pertinent History


Respiratory: No Pertinent History


Gastrointestinal: No Pertinent History


Genitourinary: No Pertinent History


Musculoskeletal: Joint Replacement


Female Surgical History: No Pertinent History


Other Surgical History: BILATERAL HIP REPLACEMENTS AND OPEN HEART SURGERY IN 

2017





- Social History


Smoking Status: Never smoker


Exposure to second hand smoke: No


Drug Use: none





- Nursing Vital Signs


Nursing Vital Signs: 


                               Initial Vital Signs











Pulse Rate  81   02/28/25 09:33


 


Respiratory Rate  24   02/28/25 09:33


 


Blood Pressure  122/64   02/28/25 09:33


 


O2 Sat by Pulse Oximetry  93 L  02/28/25 09:33








                                   Pain Scale











Pain Intensity                 0

















- [place] Coma Scale


Best Eye Response (Delray): (4) open spontaneously


Best Verbal Response ([place]ray): (5) oriented


Best Motor Response (Holly): (6) obeys commands


[place]ray Total: 15





- Physical Exam


General Appearance: no apparent distress, alert, thin


Eye Exam: bilateral eye: normal inspection, PERRL, EOMI


Ears, Nose, Throat Exam: normal ENT inspection, moist mucous membranes


Neck Exam: normal inspection, non-tender, supple, full range of motion


Respiratory: normal breath sounds, lungs clear, airway intact, No chest 

tenderness, No respiratory distress


Cardiovascular: irregular


Gastrointestinal: soft, normal bowel sounds, No tenderness


Pelvic Exam: not done


Rectal Exam: not done


Back Exam: normal inspection, normal range of motion, No CVA tenderness, No 

vertebral tenderness


Extremity Exam: normal inspection, normal range of motion, pelvis stable


Mental Status: alert, oriented x 3, cooperative


CNs Exam: normal hearing, normal speech, PERRL


Motor/Sensory: no motor deficit, no sensory deficit, no pronator drift


Skin Exam: normal color, warm, dry


**SpO2 Interpretation**: normal


O2 Delivery: Room Air





- Course


Nursing assessment & vital signs reviewed: Yes


EKG Interpreted by Me: RATE (75), A-fib, NORMAL AXIS, NORMAL INTERVALS, Right 

[place] Block, Other (No acute ischemic changes.  QTc is 451)


Ordered Tests: 


                               Active Orders 24 hr











 Category Date Time Status


 


 EKG-ER Only STAT Care  02/28/25 09:29 Active


 


 IV Insertion STAT Care  02/28/25 09:29 Active


 


 HEAD WITHOUT CONTRAST [CT] Stat Exams  02/28/25 09:30 Completed


 


 CBC W DIFF Stat Lab  02/28/25 09:45 Completed


 


 CMP Stat Lab  02/28/25 09:45 Completed


 


 CULTURE,URINE Stat Lab  02/28/25 10:00 Received


 


 MAGNESIUM Stat Lab  02/28/25 09:45 Completed


 


 TROPONIN Q4H Lab  02/28/25 09:45 Completed


 


 TROPONIN Q4H Lab  02/28/25 17:30 Ordered


 


 TSH [TSH, 3RD Generation] Stat Lab  02/28/25 09:45 Completed


 


 UA W/RFX UR CULTURE Stat Lab  02/28/25 10:00 Completed








Medication Summary














Discontinued Medications














Generic Name Dose Route Start Last Admin





  Trade Name Freq  PRN Reason Stop Dose Admin


 


Cephalexin HCl  500 mg  02/28/25 11:42  02/28/25 12:03





  Cephalexin Mh***500 Mg Capsule  PO  02/28/25 11:43  500 mg





  STAT ONE   Administration


 


Cephalexin HCl  Confirm  02/28/25 12:01 





  Cephalexin Mh***500 Mg Capsule  Administered  02/28/25 12:02 





  Dose  





  500 mg  





  .ROUTE  





  .STNovel-MED ONE  











Lab/Rad Data: 


                           Laboratory Result Diagrams





                                 02/28/25 09:45 





                                 02/28/25 09:45 





                               Laboratory Results











  02/28/25 02/28/25 02/28/25 Range/Units





  10:00 09:45 09:45 


 


WBC     (3.98-10.04)  x10^3/uL


 


RBC     (3.93-5.22)  x10^6/uL


 


Hgb     (11.2-15.7)  g/dL


 


Hct     (34.1-44.9)  %


 


MCV     (79.4-94.8)  fL


 


MCH     (25.6-32.2)  pg


 


MCHC     (32.2-35.5)  g/dL


 


RDW     (11.7-14.4)  %


 


Plt Count     (182-369)  x10^3/uL


 


MPV     (9.4-12.3)  fL


 


Gran %     (34.0-71.1)  %


 


Immature Gran % (Auto)     (0.001-0.429)  %


 


Nucleat RBC Rel Count     (0.00-0.2)  %


 


Eos # (Auto)     (0.04-0.36)  x10^3/uL


 


Immature Gran # (Auto)     (0.001-0.031)  x10^3u/L


 


Absolute Lymphs (auto)     (1.18-3.74)  x10^3/uL


 


Absolute Monos (auto)     (0.24-0.86)  x10^3/uL


 


Absolute Nucleated RBC     (0.00-0.012)  x10^3u/L


 


Lymphocytes %     (19.3-51.7)  %


 


Monocytes %     (4.7-12.5)  %


 


Eosinophils %     (0.7-5.8)  %


 


Basophils %     (0.1-1.2)  %


 


Absolute Granulocytes     (1.56-6.13)  x10^3/uL


 


Basophils #     (0.01-0.08)  x10^3/uL


 


Sodium     (135-145)  mmol/L


 


Sodium Direct     (138-146)  mmol/L


 


Potassium     (3.5-5.1)  mmol/L


 


Chloride     ()  mmol/L


 


Carbon Dioxide     (22-30)  mmol/L


 


Anion Gap     (5-15)  MEQ/L


 


BUN     (7-17)  mg/dL


 


Venous BUN     (8-26)  mg/dL


 


Creatinine     (0.52-1.04)  mg/dL


 


Estimated GFR     ML/MIN


 


Glucose     ()  mg/dL


 


Calcium     (8.4-10.2)  mg/dL


 


Ionized Calcium     (1.12-1.32)  mmol/L


 


Magnesium     (1.6-2.3)  mg/dL


 


Total Bilirubin     (0.2-1.3)  mg/dL


 


AST     (14-36)  U/L


 


ALT     (0-35)  U/L


 


Alkaline Phosphatase     ()  U/L


 


Troponin    0.01  (0.00-0.03)  ng/mL


 


Troponin I     (0.000-0.033)  ng/mL


 


Serum Total Protein     (6.3-8.2)  g/dL


 


Albumin     (3.5-5.0)  g/dL


 


Free T4   1.34   (0.78-2.19)  ng/dL


 


TSH 3rd Generation     (0.470-4.680)  mIU/L


 


Urine Color  Yellow    (Yellow)  


 


Urine Appearance  Clear    (Clear)  


 


Urine pH  6.0    (4.6-8.0)  


 


Ur Specific Gravity  1.015    (1.005-1.030)  


 


Urine Protein  Trace A    (Negative)  


 


Urine Glucose (UA)  Negative    (Negative)  mg/dL


 


Urine Ketones  Negative    (Negative)  


 


Urine Blood  Negative    (Negative)  


 


Urine Nitrite  Negative    (Negative)  


 


Urine Bilirubin  Negative    (Negative)  


 


Urine Urobilinogen  0.2    (0.2)  mg/dL


 


Ur Leukocyte Esterase  Trace A    (Negative)  


 


U Hyaline Cast (Auto)  3-5 A    (0-2)  /LPF


 


Urine Microscopic RBC  0-2    (0-5)  /HPF


 


Urine Microscopic WBC  3-5    (0-5)  /HPF


 


Ur Epithelial Cells  None Seen    (None Seen)  /HPF


 


Urine Bacteria  None Seen    (None Seen)  /HPF


 


Urine Culture Reflexed  YES    (NO)  














  02/28/25 02/28/25 02/28/25 Range/Units





  09:45 09:45 09:45 


 


WBC    5.0  (3.98-10.04)  x10^3/uL


 


RBC    3.82 L  (3.93-5.22)  x10^6/uL


 


Hgb    9.9 L  (11.2-15.7)  g/dL


 


Hct    32.5 L  (34.1-44.9)  %


 


MCV    85.1  (79.4-94.8)  fL


 


MCH    25.9  (25.6-32.2)  pg


 


MCHC    30.5 L  (32.2-35.5)  g/dL


 


RDW    17.3 H  (11.7-14.4)  %


 


Plt Count    235  (182-369)  x10^3/uL


 


MPV    10.3  (9.4-12.3)  fL


 


Gran %    63.4  (34.0-71.1)  %


 


Immature Gran % (Auto)    0.2  (0.001-0.429)  %


 


Nucleat RBC Rel Count    0.0  (0.00-0.2)  %


 


Eos # (Auto)    0.07  (0.04-0.36)  x10^3/uL


 


Immature Gran # (Auto)    0.01  (0.001-0.031)  x10^3u/L


 


Absolute Lymphs (auto)    1.03 L  (1.18-3.74)  x10^3/uL


 


Absolute Monos (auto)    0.69  (0.24-0.86)  x10^3/uL


 


Absolute Nucleated RBC    0.00  (0.00-0.012)  x10^3u/L


 


Lymphocytes %    20.5  (19.3-51.7)  %


 


Monocytes %    13.7 H  (4.7-12.5)  %


 


Eosinophils %    1.4  (0.7-5.8)  %


 


Basophils %    0.8  (0.1-1.2)  %


 


Absolute Granulocytes    3.18  (1.56-6.13)  x10^3/uL


 


Basophils #    0.04  (0.01-0.08)  x10^3/uL


 


Sodium   137   (135-145)  mmol/L


 


Sodium Direct   137 L   (138-146)  mmol/L


 


Potassium   3.8   (3.5-5.1)  mmol/L


 


Chloride   100   ()  mmol/L


 


Carbon Dioxide   28   (22-30)  mmol/L


 


Anion Gap   12.8   (5-15)  MEQ/L


 


BUN   36 H   (7-17)  mg/dL


 


Venous BUN   40 H   (8-26)  mg/dL


 


Creatinine   1.44 H   (0.52-1.04)  mg/dL


 


Estimated GFR   35.2   ML/MIN


 


Glucose   121 H   ()  mg/dL


 


Calcium   9.9   (8.4-10.2)  mg/dL


 


Ionized Calcium   1.26   (1.12-1.32)  mmol/L


 


Magnesium   2.2   (1.6-2.3)  mg/dL


 


Total Bilirubin   1.00   (0.2-1.3)  mg/dL


 


AST   48 H   (14-36)  U/L


 


ALT   15   (0-35)  U/L


 


Alkaline Phosphatase   62   ()  U/L


 


Troponin     (0.00-0.03)  ng/mL


 


Troponin I  < 0.012    (0.000-0.033)  ng/mL


 


Serum Total Protein   7.1   (6.3-8.2)  g/dL


 


Albumin   3.7   (3.5-5.0)  g/dL


 


Free T4     (0.78-2.19)  ng/dL


 


TSH 3rd Generation  1.597    (0.470-4.680)  mIU/L


 


Urine Color     (Yellow)  


 


Urine Appearance     (Clear)  


 


Urine pH     (4.6-8.0)  


 


Ur Specific Gravity     (1.005-1.030)  


 


Urine Protein     (Negative)  


 


Urine Glucose (UA)     (Negative)  mg/dL


 


Urine Ketones     (Negative)  


 


Urine Blood     (Negative)  


 


Urine Nitrite     (Negative)  


 


Urine Bilirubin     (Negative)  


 


Urine Urobilinogen     (0.2)  mg/dL


 


Ur Leukocyte Esterase     (Negative)  


 


U Hyaline Cast (Auto)     (0-2)  /LPF


 


Urine Microscopic RBC     (0-5)  /HPF


 


Urine Microscopic WBC     (0-5)  /HPF


 


Ur Epithelial Cells     (None Seen)  /HPF


 


Urine Bacteria     (None Seen)  /HPF


 


Urine Culture Reflexed     (NO)  














- Progress


Progress: improved, re-examined


Progress Note: 





02/28/25 11:36


My medical decision making of the assignment of moderate complexity to this 

patient's medical issue today is based on review of the patient's past medical 

history, review of the patient's medication list, review of patient drug allergy

list, history present also and physical findings on examination.  The workup in 

this patient includes placement of intravenous line, CBC, CMP, magnesium level, 

troponin level, twelve-lead EKG, urinalysis, CT scan of the head without 

contrast, free T4 and TSH levels.





Differential diagnosis includes was not limited to myocardial infarction, 

electrolyte abnormalities, cardiac arrhythmias, dehydration, urinary tract 

infection


02/28/25 11:39


The CT scan of the head without contrast was interpreted by the radiologist and 

I reviewed the impression.  The impression states no calvarium fractures.  No 

suggestion of acute ischemic insult.  There are a few tiny chronic lacunar 

infarcts present in the periventricular location.  Mild age-related brain 

involutional changes.


02/28/25 12:08


I interpreted the patient's laboratory data results.  Based on the laboratory 

data results, patient does have anemia, UTI and chronic renal disease.  There 

are no other acute, emergent medical issues.


Counseled pt/family regarding: lab results, diagnosis, need for follow-up, rad 

results





Medical Desision Making





- Diagnostic Testing


Diagnostic test were ordered, analyzed, and reviewed by me: Yes


Radiological Interpretation: Reviewed by me, Teleradiologist Report





- Risk of complications


Low Risk: Low risk of morbidity from additional dx testing or treatment





- Departure


Departure Disposition: Home


Clinical Impression: 


 Dizziness, Chronic renal disease, Anemia, UTI (urinary tract infection)





Condition: Stable


Critical Care Time: No


Referrals: 


ADRIAN ACEVEDO, NP [Primary Care Provider] - Follow up/PCP as directed


Additional Instructions: 


Drink plenty of clear liquids.  Call your prescribing provider today, 2/28/2025 

to make arrangements for evaluation and follow-up in the next 3 to 5 days to 

discuss anemia, renal disease issues and further management of the dizziness.


Prescriptions: 


Cephalexin Mh 500 mg** [Keflex 500 mg**] 500 mg PO TID #15 cap

## 2025-02-28 NOTE — XRAY
CLINICAL HISTORY: Dizziness

COMPARISON: CT dated 07/13/2022.

TECHNIQUE: Axial non-contrast CT scan of the brain was performed from the

skull base to the high parietal region. One of the following dose reduction

techniques were utilized for this exam: Automated exposure control, adjustment

of the mA and/or kV according to patient size, use of iterative reconstruction.

FINDINGS:

Brain Parenchyma:

 Few tiny scattered hypodensities are seen in periventricular locations and

deep white matter, likely lacunar infarcts.

No evidence of acute infarct, hemorrhage, or mass effect.



Ventricular System:

 Ventricles are normal in size and configuration.

No evidence of hydrocephalus or ventricular enlargement.

 Choroid plexus calcifications are seen.



Subarachnoid Spaces:

 Mildly accentuated cortical sulci, Sylvian fissures, and extra-axial CSF

spaces, consistent with age.

No evidence of subarachnoid hemorrhage or extra-axial fluid collections.



Cerebellum and Brainstem:

 Normal size and density.

No masses, lesions, or areas of abnormal density.



Orbits:

 Normal appearance of the globes, optic nerves, and extraocular muscles.

 Bilateral symmetrical scleral calcific densities are seen likely post

procedural.

No evidence of orbital masses or abnormal density.



Sinuses:

 Clear paranasal sinuses.

No evidence of sinusitis or mucosal thickening.



Mastoid Air Cells:

 Bilateral mastoid air cells show normal aeration.



Skull:

 Normal skull morphology.

 No calvarial fracture or scalp hematoma.

IMPRESSION:

1. No calvarial fractures, intra or extra-axial hematomas or parenchymal

territorial hypodense areas suggestive of acute ischemic insult.

2. Mild age-related brain involutional changes with few tiny lacunar infarcts.

3. No interval change in comparison to previous CT dated 07/13/2022.



_____________________________________

Electronically Signed by: Lavinia White MD. (02/28/2025 11:24:16 EST)